# Patient Record
Sex: MALE | Race: WHITE | NOT HISPANIC OR LATINO | Employment: OTHER | URBAN - METROPOLITAN AREA
[De-identification: names, ages, dates, MRNs, and addresses within clinical notes are randomized per-mention and may not be internally consistent; named-entity substitution may affect disease eponyms.]

---

## 2018-11-01 ENCOUNTER — OFFICE VISIT (OUTPATIENT)
Dept: NEUROLOGY | Facility: CLINIC | Age: 83
End: 2018-11-01
Payer: MEDICARE

## 2018-11-01 VITALS
HEART RATE: 60 BPM | SYSTOLIC BLOOD PRESSURE: 136 MMHG | WEIGHT: 149.8 LBS | DIASTOLIC BLOOD PRESSURE: 66 MMHG | HEIGHT: 62 IN | BODY MASS INDEX: 27.57 KG/M2

## 2018-11-01 DIAGNOSIS — F02.80 SDAT (SENILE DEMENTIA OF ALZHEIMER'S TYPE) (HCC): Primary | ICD-10-CM

## 2018-11-01 DIAGNOSIS — F32.89 OTHER DEPRESSION: ICD-10-CM

## 2018-11-01 DIAGNOSIS — G30.1 SDAT (SENILE DEMENTIA OF ALZHEIMER'S TYPE) (HCC): Primary | ICD-10-CM

## 2018-11-01 PROBLEM — F32.A DEPRESSION: Status: ACTIVE | Noted: 2018-11-01

## 2018-11-01 PROCEDURE — 99204 OFFICE O/P NEW MOD 45 MIN: CPT | Performed by: PSYCHIATRY & NEUROLOGY

## 2018-11-01 RX ORDER — LEVOTHYROXINE SODIUM 112 UG/1
1 CAPSULE ORAL DAILY
COMMUNITY

## 2018-11-01 RX ORDER — QUETIAPINE FUMARATE 25 MG/1
12.5 TABLET, FILM COATED ORAL DAILY PRN
Qty: 30 TABLET | Refills: 1 | Status: SHIPPED | OUTPATIENT
Start: 2018-11-01 | End: 2019-05-23 | Stop reason: SDUPTHER

## 2018-11-01 RX ORDER — SENNA PLUS 8.6 MG/1
1 TABLET ORAL DAILY
COMMUNITY
End: 2019-05-23 | Stop reason: ALTCHOICE

## 2018-11-01 RX ORDER — DONEPEZIL HYDROCHLORIDE 10 MG/1
10 TABLET, FILM COATED ORAL
COMMUNITY
End: 2019-05-23 | Stop reason: SDUPTHER

## 2018-11-01 RX ORDER — AMLODIPINE BESYLATE 5 MG/1
5 TABLET ORAL DAILY
COMMUNITY

## 2018-11-01 RX ORDER — ASPIRIN 81 MG/1
81 TABLET ORAL DAILY
COMMUNITY

## 2018-11-01 RX ORDER — TRAMADOL HYDROCHLORIDE 50 MG/1
50 TABLET ORAL EVERY 6 HOURS PRN
COMMUNITY
End: 2019-05-23 | Stop reason: ALTCHOICE

## 2018-11-01 RX ORDER — FERROUS SULFATE 325(65) MG
325 TABLET ORAL 2 TIMES DAILY WITH MEALS
COMMUNITY
End: 2019-05-23 | Stop reason: ALTCHOICE

## 2018-11-01 RX ORDER — MEMANTINE HYDROCHLORIDE 10 MG/1
10 TABLET ORAL 2 TIMES DAILY
Qty: 60 TABLET | Refills: 3 | Status: SHIPPED | OUTPATIENT
Start: 2018-11-01 | End: 2019-05-23 | Stop reason: SDUPTHER

## 2018-11-01 RX ORDER — LACTULOSE 10 G/15ML
30 SOLUTION ORAL AS NEEDED
COMMUNITY
End: 2019-05-23 | Stop reason: ALTCHOICE

## 2018-11-01 RX ORDER — A/SINGAPORE/GP1908/2015 IVR-180 (H1N1) (AN A/MICHIGAN/45/2015 (H1N1)PDM09-LIKE VIRUS), A/HONG KONG/4801/2014, NYMC X-263B (H3N2) (AN A/HONG KONG/4801/2014-LIKE VIRUS), AND B/BRISBANE/60/2008, WILD TYPE (A B/BRISBANE/60/2008-LIKE VIRUS) 15; 15; 15 UG/.5ML; UG/.5ML; UG/.5ML
INJECTION, SUSPENSION INTRAMUSCULAR
Refills: 0 | COMMUNITY
Start: 2018-10-22 | End: 2018-11-01 | Stop reason: ALTCHOICE

## 2018-11-01 NOTE — PROGRESS NOTES
Consultation - Neurology   Earnest Recinos 80 y o  male MRN: [id number]  Unit/Bed#:  Encounter: [id number]      Physician Requesting Consult: No att  providers found  Chief complaint :  Patient is a 40-year-old right-handed gentleman accompanied with his wife and daughter with a history of dementia for the last 2-3 years  HPI:  Patient is a 40-year-old right-handed gentleman who apparently has had progressive cognitive dysfunction over the last 2-3 years, with his most initial evaluation being in September of 2017 at the [address] [place] [address] [facility]  Prior to that he had suffered a fall and a right hip fracture for which he was hospitalized and noted to have severe delirium lasting for a few days, with behavioral dysfunction and was maintained on Zoloft and Seroquel  Patient had a complete evaluation at [facility] in September of 2017 felt to have mixed Alzheimer's and vascular dementia due to vascular risk factors and started on Aricept 10 mg daily  He has been maintained on the same dosage and was last evaluated by them in December of 2017  Patient has subsequently moved to a dementia unit locally 3 months ago and 1 week ago had a bout of agitation, confusion at night and attempted to walk out of the dementia unit  Patient has been off the Seroquel at this time  Patient's most recent mini-mental status exam score was 21/30  MRI of the brain showed diminished hippocampal volume with chronic ischemic changes bilaterally and age-related atrophy  Patient went to school for 18 years, no history of head injuries, no family history of dementia, and 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  Patient does have intermittent knee pain but otherwise denies any central neurological symptoms, motor or sensory symptoms in the upper lower extremities, and ambulates with the help of a walker    Cognitively he has significant episodic/semantic/working memory difficulty, difficulty with executive functioning, visuospatial dysfunction and intermittent episodes of agitation  Review of Systems:  Review of Systems   Constitutional: Negative  Negative for appetite change and fever  HENT: Positive for hearing loss (left)  Negative for tinnitus, trouble swallowing and voice change  Eyes: Negative  Negative for photophobia and pain  Respiratory: Negative  Negative for shortness of breath  Cardiovascular: Negative  Negative for palpitations  Gastrointestinal: Negative  Negative for nausea and vomiting  Endocrine: Negative  Negative for cold intolerance and heat intolerance  Genitourinary: Negative  Negative for dysuria, frequency and urgency  Musculoskeletal: Negative  Negative for myalgias and neck pain  Skin: Negative  Negative for rash  Neurological: Negative  Negative for dizziness, tremors, seizures, syncope, facial asymmetry, speech difficulty, weakness, light-headedness, numbness and headaches  Hematological: Negative  Does not bruise/bleed easily  Psychiatric/Behavioral: Positive for confusion  Negative for hallucinations and sleep disturbance  All other systems reviewed and are negative        Historical Information   Past Medical History:   Diagnosis Date    Accidental fall     Anemia     Depression     Diverticulosis     GERD (gastroesophageal reflux disease)     Hypertension     Hypothyroidism     Memory loss     Osteoarthritis     Vascular dementia      Past Surgical History:   Procedure Laterality Date    APPENDECTOMY      HIP FRACTURE SURGERY Left      Social History   History   Smoking Status    Former Smoker   Smokeless Tobacco    Never Used     History   Alcohol Use    Yes     Comment: beer      History   Drug Use No       Family History:   Family History   Problem Relation Age of Onset    Diabetes Mother     Dementia Mother     Heart attack Father        No Known Allergies    Meds:  All current active meds have been reviewed    Scheduled Meds:  PRN Meds:     Physical Exam:   Objective   Vitals:   Vitals:    11/01/18 1242   BP: 136/66   Pulse: 60   ,Body mass index is 27 4 kg/m²  General appearance: Cooperative in no acute distress  Head & neck head is atraumatic and normocephalic  Neck is supple with full range of motion  Cardiovascular: Carotid arteries-no carotid bruits  Neurologic:  Patient is alert awake oriented, Viola cognitive assessment score is 14/30 on today's exam, speech is fluent  No evidence of any aphasia or dysarthria  Cranial nerve examination reveals visual fields are full to threat, pupils equal and reactive, extraocular movements intact, fundi showed sharp disc margins, sensation in the V1 V2 V3 distribution is symmetric, no obvious facial asymmetry noted,Hearing is preserved, tongue is midline and gag is adequate, shoulder shrug is symmetric bilaterally  Motor examination reveals normal tone and bulk, no evidence of any drift to the outstretched extremities, strength is 5/5 preserved bilaterally in both upper and lower extremities, deep tendon reflexes are intact, toes are downgoing  Sensory examination to pinprick light touch proprioception and vibration is preserved bilaterally, patient does not extinguish double simultaneous stimuli  Coordination no evidence of any finger-to-nose dysmetria  Gait patient ambulates with the help of a walker, with a slight shuffling quality to his gait  Assessment:  Dementia, Alzheimer's  with behavioral dysfunction  Mood disorder  Plan: At this time after lengthy discussion with the patient and the family, due to the decline as far as his cognitive scores a concern which the family believes also that he has had decline in his abilities since the last 1 year will advised to start memantine 10 mg twice a day in addition to the Aricept 10 mg daily    Patient also gets intermittent bouts of agitation and confusion will add Seroquel 12 5 mg to be used only on a p r n  Basis for confusion and agitation  Patient has been actively participating in group activities at the facility with cognitive stimulation and motor retraining, is advised to continue the same  Will return back to see me in 3 months  11/1/2018,12:57 PM    Dictation voice to text software has been used in the creation of this document

## 2019-03-14 ENCOUNTER — TELEPHONE (OUTPATIENT)
Dept: NEUROLOGY | Facility: CLINIC | Age: 84
End: 2019-03-14

## 2019-05-23 ENCOUNTER — OFFICE VISIT (OUTPATIENT)
Dept: NEUROLOGY | Facility: CLINIC | Age: 84
End: 2019-05-23
Payer: MEDICARE

## 2019-05-23 VITALS
DIASTOLIC BLOOD PRESSURE: 72 MMHG | BODY MASS INDEX: 25.52 KG/M2 | HEIGHT: 63 IN | HEART RATE: 62 BPM | WEIGHT: 144 LBS | SYSTOLIC BLOOD PRESSURE: 120 MMHG

## 2019-05-23 DIAGNOSIS — G30.1 SDAT (SENILE DEMENTIA OF ALZHEIMER'S TYPE) (HCC): Primary | ICD-10-CM

## 2019-05-23 DIAGNOSIS — F02.80 SDAT (SENILE DEMENTIA OF ALZHEIMER'S TYPE) (HCC): Primary | ICD-10-CM

## 2019-05-23 PROCEDURE — 99214 OFFICE O/P EST MOD 30 MIN: CPT | Performed by: PSYCHIATRY & NEUROLOGY

## 2019-05-23 RX ORDER — MEMANTINE HYDROCHLORIDE 10 MG/1
10 TABLET ORAL 2 TIMES DAILY
Qty: 60 TABLET | Refills: 6 | Status: SHIPPED | OUTPATIENT
Start: 2019-05-23 | End: 2019-09-05 | Stop reason: SDUPTHER

## 2019-05-23 RX ORDER — DONEPEZIL HYDROCHLORIDE 10 MG/1
10 TABLET, FILM COATED ORAL
Qty: 30 TABLET | Refills: 6 | Status: SHIPPED | OUTPATIENT
Start: 2019-05-23 | End: 2019-09-05 | Stop reason: SDUPTHER

## 2019-05-23 RX ORDER — QUETIAPINE FUMARATE 25 MG/1
12.5 TABLET, FILM COATED ORAL 2 TIMES DAILY
Qty: 30 TABLET | Refills: 6 | Status: SHIPPED | OUTPATIENT
Start: 2019-05-23 | End: 2019-09-05 | Stop reason: SDUPTHER

## 2019-05-23 RX ORDER — RANITIDINE 150 MG/1
150 TABLET ORAL EVERY MORNING
COMMUNITY
End: 2020-05-14 | Stop reason: ALTCHOICE

## 2019-09-05 ENCOUNTER — OFFICE VISIT (OUTPATIENT)
Dept: NEUROLOGY | Facility: CLINIC | Age: 84
End: 2019-09-05
Payer: MEDICARE

## 2019-09-05 VITALS
DIASTOLIC BLOOD PRESSURE: 76 MMHG | HEART RATE: 60 BPM | HEIGHT: 63 IN | WEIGHT: 153 LBS | BODY MASS INDEX: 27.11 KG/M2 | SYSTOLIC BLOOD PRESSURE: 136 MMHG

## 2019-09-05 DIAGNOSIS — F02.80 SDAT (SENILE DEMENTIA OF ALZHEIMER'S TYPE) (HCC): ICD-10-CM

## 2019-09-05 DIAGNOSIS — G30.1 SDAT (SENILE DEMENTIA OF ALZHEIMER'S TYPE) (HCC): ICD-10-CM

## 2019-09-05 PROCEDURE — 99214 OFFICE O/P EST MOD 30 MIN: CPT | Performed by: PSYCHIATRY & NEUROLOGY

## 2019-09-05 RX ORDER — DONEPEZIL HYDROCHLORIDE 10 MG/1
10 TABLET, FILM COATED ORAL
Qty: 30 TABLET | Refills: 6 | Status: SHIPPED | OUTPATIENT
Start: 2019-09-05 | End: 2019-12-18 | Stop reason: SDUPTHER

## 2019-09-05 RX ORDER — QUETIAPINE FUMARATE 25 MG/1
12.5 TABLET, FILM COATED ORAL 2 TIMES DAILY
Qty: 30 TABLET | Refills: 6 | Status: SHIPPED | OUTPATIENT
Start: 2019-09-05 | End: 2019-10-22 | Stop reason: SDUPTHER

## 2019-09-05 RX ORDER — MEMANTINE HYDROCHLORIDE 10 MG/1
10 TABLET ORAL 2 TIMES DAILY
Qty: 60 TABLET | Refills: 6 | Status: SHIPPED | OUTPATIENT
Start: 2019-09-05 | End: 2020-03-10

## 2019-09-05 NOTE — PROGRESS NOTES
Progress Note - Neurology   Isi Padilla 80 y o  male MRN: [id number]  Unit/Bed#:  Encounter: [id number]      Subjective:   Patient is here for a visit accompanied is [address] [place] [address] ve is a resident of a personal care facility, with a history of dementia and depression  Patient was experiencing behavioral symptoms and at his last visit was started on Seroquel 12 5 mg twice a day which has helped significantly with his behavioral symptoms and patient also remains on Aricept and Namenda  He is on Zoloft 50 mg daily for the depression which has also been helpful and as per his wife he has been stable  He denies any new neurological symptoms, at baseline ambulates with the help of a walker  Patient did suffer 1 fall recently with no major injuries on attempting to get off the toilet seat he went falling forward and hit the shower  Patient was seen in the emergency room with no evidence of any injuries noted  ROS:   Review of Systems   Constitutional: Negative  Negative for appetite change and fever  HENT: Negative  Negative for hearing loss, tinnitus, trouble swallowing and voice change  Eyes: Negative  Negative for photophobia and pain  Respiratory: Negative  Negative for shortness of breath  Cardiovascular: Negative  Negative for palpitations  Gastrointestinal: Negative  Negative for nausea and vomiting  Endocrine: Negative  Negative for cold intolerance and heat intolerance  Genitourinary: Negative  Negative for dysuria, frequency and urgency  Musculoskeletal: Positive for gait problem  Negative for back pain, myalgias and neck pain  Skin: Negative  Negative for rash  Neurological: Negative for dizziness, tremors, seizures, syncope, facial asymmetry, speech difficulty, weakness, light-headedness, numbness and headaches  Hematological: Negative  Does not bruise/bleed easily  Psychiatric/Behavioral: Positive for confusion  Negative for hallucinations and sleep disturbance  Vitals:   Vitals:    09/05/19 1534   BP: 136/76   BP Location: Left arm   Patient Position: Sitting   Cuff Size: Adult   Pulse: 60   Weight: 69 4 kg (153 lb)   Height: 5' 3" (1 6 m)   ,Body mass index is 27 1 kg/m²  MEDS:      Current Outpatient Medications:     amLODIPine (NORVASC) 5 mg tablet, Take 5 mg by mouth daily , Disp: , Rfl:     aspirin (ECOTRIN LOW STRENGTH) 81 mg EC tablet, Take 81 mg by mouth daily, Disp: , Rfl:     donepezil (ARICEPT) 10 mg tablet, Take 1 tablet (10 mg total) by mouth daily at bedtime, Disp: 30 tablet, Rfl: 6    Levothyroxine Sodium 112 MCG CAPS, Take 1 capsule by mouth daily, Disp: , Rfl:     memantine (NAMENDA) 10 mg tablet, Take 1 tablet (10 mg total) by mouth 2 (two) times a day, Disp: 60 tablet, Rfl: 6    Nutritional Supplements (ENSURE PO), Take 1 Can by mouth 2 (two) times a day, Disp: , Rfl:     QUEtiapine (SEROquel) 25 mg tablet, Take 0 5 tablets (12 5 mg total) by mouth 2 (two) times a day, Disp: 30 tablet, Rfl: 6    ranitidine (ZANTAC) 150 mg tablet, Take 150 mg by mouth every morning, Disp: , Rfl:     sertraline (ZOLOFT) 50 mg tablet, Take 50 mg by mouth daily, Disp: , Rfl:   :    Physical Exam:  General appearance: alert, appears stated age and cooperative  Head: Normocephalic, without obvious abnormality, atraumatic    Rajat cognitive assessment score is 15/30, stable as compared to 3 months ago and there is no new cranial nerve, motor or sensory deficits noted in the upper or lower extremities  No evidence of any dysmetria was noted and no bruits were appreciable in the neck  Lab Results: I have personally reviewed pertinent reports  Imaging Studies: I have personally reviewed pertinent reports  Assessment:  1  Dementia, Alzheimer's type with behavioral dysfunction  2  History of depression      Plan:  Patient and his wife were explained the results of the [place] testing today, is advised to continue present medications, and will now return back to see me in 6 months  His behavioral symptoms have improved and is encouraged to participate in activities at the facility  9/5/2019,4:01 PM    Dictation voice to text software has been used in the creation of this document  Please consider this in light of any contextual or grammatical errors

## 2019-10-22 DIAGNOSIS — F02.80 SDAT (SENILE DEMENTIA OF ALZHEIMER'S TYPE) (HCC): ICD-10-CM

## 2019-10-22 DIAGNOSIS — G30.1 SDAT (SENILE DEMENTIA OF ALZHEIMER'S TYPE) (HCC): ICD-10-CM

## 2019-10-23 RX ORDER — QUETIAPINE FUMARATE 25 MG/1
TABLET, FILM COATED ORAL
Qty: 28 TABLET | Refills: 4 | Status: SHIPPED | OUTPATIENT
Start: 2019-10-23 | End: 2020-07-02

## 2019-12-18 DIAGNOSIS — F02.80 SDAT (SENILE DEMENTIA OF ALZHEIMER'S TYPE) (HCC): ICD-10-CM

## 2019-12-18 DIAGNOSIS — G30.1 SDAT (SENILE DEMENTIA OF ALZHEIMER'S TYPE) (HCC): ICD-10-CM

## 2019-12-19 RX ORDER — DONEPEZIL HYDROCHLORIDE 10 MG/1
TABLET, FILM COATED ORAL
Qty: 28 TABLET | Refills: 4 | Status: SHIPPED | OUTPATIENT
Start: 2019-12-19

## 2020-03-10 DIAGNOSIS — G30.1 SDAT (SENILE DEMENTIA OF ALZHEIMER'S TYPE) (HCC): ICD-10-CM

## 2020-03-10 DIAGNOSIS — F02.80 SDAT (SENILE DEMENTIA OF ALZHEIMER'S TYPE) (HCC): ICD-10-CM

## 2020-03-10 RX ORDER — MEMANTINE HYDROCHLORIDE 10 MG/1
TABLET ORAL
Qty: 56 TABLET | Refills: 4 | Status: SHIPPED | OUTPATIENT
Start: 2020-03-10 | End: 2020-07-22

## 2020-05-14 ENCOUNTER — TELEMEDICINE (OUTPATIENT)
Dept: NEUROLOGY | Facility: CLINIC | Age: 85
End: 2020-05-14
Payer: MEDICARE

## 2020-05-14 DIAGNOSIS — F02.80 SDAT (SENILE DEMENTIA OF ALZHEIMER'S TYPE) (HCC): Primary | ICD-10-CM

## 2020-05-14 DIAGNOSIS — G30.1 SDAT (SENILE DEMENTIA OF ALZHEIMER'S TYPE) (HCC): Primary | ICD-10-CM

## 2020-05-14 PROCEDURE — 99442 PR PHYS/QHP TELEPHONE EVALUATION 11-20 MIN: CPT | Performed by: PSYCHIATRY & NEUROLOGY

## 2020-05-14 RX ORDER — DIVALPROEX SODIUM 250 MG/1
250 TABLET, DELAYED RELEASE ORAL EVERY 12 HOURS SCHEDULED
Qty: 60 TABLET | Refills: 1 | Status: SHIPPED | OUTPATIENT
Start: 2020-05-14 | End: 2020-06-02

## 2020-05-14 RX ORDER — DIVALPROEX SODIUM 250 MG/1
250 TABLET, DELAYED RELEASE ORAL EVERY 8 HOURS SCHEDULED
Qty: 60 TABLET | Refills: 1 | Status: SHIPPED | OUTPATIENT
Start: 2020-05-14 | End: 2020-05-14

## 2020-05-14 RX ORDER — FAMOTIDINE 20 MG/1
TABLET, FILM COATED ORAL EVERY MORNING
COMMUNITY
Start: 2020-04-28

## 2020-05-14 RX ORDER — LOPERAMIDE HYDROCHLORIDE 2 MG/1
CAPSULE ORAL DAILY PRN
COMMUNITY
Start: 2020-04-10

## 2020-05-14 RX ORDER — CLONAZEPAM 0.5 MG/1
1 TABLET ORAL DAILY
COMMUNITY
Start: 2020-04-28

## 2020-06-02 DIAGNOSIS — G30.1 SDAT (SENILE DEMENTIA OF ALZHEIMER'S TYPE) (HCC): ICD-10-CM

## 2020-06-02 DIAGNOSIS — F02.80 SDAT (SENILE DEMENTIA OF ALZHEIMER'S TYPE) (HCC): ICD-10-CM

## 2020-06-02 RX ORDER — DIVALPROEX SODIUM 250 MG/1
TABLET, DELAYED RELEASE ORAL
Qty: 56 TABLET | Refills: 4 | Status: SHIPPED | OUTPATIENT
Start: 2020-06-02 | End: 2020-10-13

## 2020-07-02 DIAGNOSIS — G30.1 SDAT (SENILE DEMENTIA OF ALZHEIMER'S TYPE) (HCC): ICD-10-CM

## 2020-07-02 DIAGNOSIS — F02.80 SDAT (SENILE DEMENTIA OF ALZHEIMER'S TYPE) (HCC): ICD-10-CM

## 2020-07-02 RX ORDER — QUETIAPINE FUMARATE 25 MG/1
TABLET, FILM COATED ORAL
Qty: 15 TABLET | Refills: 4 | Status: SHIPPED | OUTPATIENT
Start: 2020-07-02

## 2020-07-22 DIAGNOSIS — G30.1 SDAT (SENILE DEMENTIA OF ALZHEIMER'S TYPE) (HCC): ICD-10-CM

## 2020-07-22 DIAGNOSIS — F02.80 SDAT (SENILE DEMENTIA OF ALZHEIMER'S TYPE) (HCC): ICD-10-CM

## 2020-07-22 RX ORDER — MEMANTINE HYDROCHLORIDE 10 MG/1
TABLET ORAL
Qty: 56 TABLET | Refills: 4 | Status: SHIPPED | OUTPATIENT
Start: 2020-07-22 | End: 2020-12-09

## 2020-10-13 DIAGNOSIS — F02.80 SDAT (SENILE DEMENTIA OF ALZHEIMER'S TYPE) (HCC): ICD-10-CM

## 2020-10-13 DIAGNOSIS — G30.1 SDAT (SENILE DEMENTIA OF ALZHEIMER'S TYPE) (HCC): ICD-10-CM

## 2020-10-13 RX ORDER — DIVALPROEX SODIUM 250 MG/1
TABLET, DELAYED RELEASE ORAL
Qty: 56 TABLET | Refills: 4 | Status: SHIPPED | OUTPATIENT
Start: 2020-10-13

## 2020-12-08 DIAGNOSIS — F02.80 SDAT (SENILE DEMENTIA OF ALZHEIMER'S TYPE) (HCC): ICD-10-CM

## 2020-12-08 DIAGNOSIS — G30.1 SDAT (SENILE DEMENTIA OF ALZHEIMER'S TYPE) (HCC): ICD-10-CM

## 2020-12-09 RX ORDER — MEMANTINE HYDROCHLORIDE 10 MG/1
TABLET ORAL
Qty: 56 TABLET | Refills: 4 | Status: SHIPPED | OUTPATIENT
Start: 2020-12-09